# Patient Record
Sex: FEMALE | URBAN - METROPOLITAN AREA
[De-identification: names, ages, dates, MRNs, and addresses within clinical notes are randomized per-mention and may not be internally consistent; named-entity substitution may affect disease eponyms.]

---

## 2018-04-18 ENCOUNTER — TELEPHONE (OUTPATIENT)
Dept: TRANSPLANT | Facility: CLINIC | Age: 28
End: 2018-04-18

## 2018-04-18 NOTE — TELEPHONE ENCOUNTER
Luna Spencer called and stated that she is interested in becoming a living donor for her half sister, Cira Gutierrez.  Medical and social history obtained.  No contraindications noted.  All questions answered.  HLA kit requested.

## 2018-06-18 ENCOUNTER — TELEPHONE (OUTPATIENT)
Dept: TRANSPLANT | Facility: CLINIC | Age: 28
End: 2018-06-18

## 2018-06-18 DIAGNOSIS — Z00.5 TRANSPLANT DONOR EVALUATION: Primary | ICD-10-CM

## 2018-06-26 ENCOUNTER — LAB VISIT (OUTPATIENT)
Dept: LAB | Facility: HOSPITAL | Age: 28
End: 2018-06-26
Payer: COMMERCIAL

## 2018-06-26 DIAGNOSIS — Z00.5 TRANSPLANT DONOR EVALUATION: ICD-10-CM

## 2018-06-26 LAB
ABO GROUP BLD: NORMAL
RH BLD: NORMAL

## 2018-06-26 PROCEDURE — 86901 BLOOD TYPING SEROLOGIC RH(D): CPT | Mod: 91,TXP

## 2018-06-26 PROCEDURE — 81375 HLA II TYPING AG EQUIV LR: CPT | Mod: PO,59

## 2018-06-26 PROCEDURE — 81382 HLA II TYPING 1 LOC HR: CPT | Mod: PO

## 2018-06-26 PROCEDURE — 86900 BLOOD TYPING SEROLOGIC ABO: CPT | Mod: TXP

## 2018-06-26 PROCEDURE — 81382 HLA II TYPING 1 LOC HR: CPT | Mod: 91,PO

## 2018-06-26 PROCEDURE — 36415 COLL VENOUS BLD VENIPUNCTURE: CPT | Mod: TXP

## 2018-06-26 PROCEDURE — 81372 HLA I TYPING COMPLETE LR: CPT | Mod: PO

## 2018-06-26 PROCEDURE — 86901 BLOOD TYPING SEROLOGIC RH(D): CPT | Mod: TXP

## 2018-07-05 ENCOUNTER — TELEPHONE (OUTPATIENT)
Dept: TRANSPLANT | Facility: CLINIC | Age: 28
End: 2018-07-05

## 2018-07-05 LAB
DNA MARKER ASSESSED PNL: 29
DNA MARKER ASSESSED PNL: 3
DNA MARKER ASSESSED PNL: 4
DNA MARKER ASSESSED PNL: 49
DNA MARKER ASSESSED PNL: 6
DNA MARKER ASSESSED PNL: 7
DNA MARKER ASSESSED PNL: 7
DNA MARKER ASSESSED PNL: NORMAL
HLA DQA1 1: NORMAL
HLA DQA1 2: NORMAL
HLA DRB4 1: NORMAL
HLA HR DPB1: NORMAL
HLA HR DPB2: NORMAL
HLA-DP 1 SERO. EQUIV: NORMAL
HLA-DP 2 SERO. EQUIV: NORMAL
HLA-DPA1 1: NORMAL
HLA-DPA1 2: NORMAL
HLA-DPB1 1: NORMAL
HLA-DPB1 2: NORMAL
HLA-DQ 1 SERO. EQUIV: 6
HLA-DQ 2 SERO. EQUIV: NORMAL
HLA-DQB1 1: NORMAL
HLA-DQB1 2: NORMAL
HLA-DRB1 1 SERO. EQUIV: 15
HLA-DRB1 1: NORMAL
HLA-DRB1 2 SERO. EQUIV: NORMAL
HLA-DRB1 2: NORMAL
HLA-DRB3 1: NORMAL
HLA-DRB3 2: NORMAL
HLA-DRB345 1 SERO. EQUIV: 51
HLA-DRB345 2 SERO. EQUIV: NORMAL
HLA-DRB4 2: NORMAL
HLA-DRB5 1 HI RES: NORMAL
HLA-DRB5 1: NORMAL
HLA-DRB5 2 HI RES: NORMAL
HLA-DRB5 2: NORMAL
HLATY INTERPRETATION: NORMAL
HRDPB TESTING DATE: NORMAL
HRDRW TESTING DATE: NORMAL
LDNA2 TESTING DATE: NORMAL

## 2018-07-05 NOTE — TELEPHONE ENCOUNTER
Patient notified that the results of the crossmatch with her sister show that they are not a match. Discussed the option of paired donation should other donors not be approved. All questions were answered and patient verbalized understanding.